# Patient Record
(demographics unavailable — no encounter records)

---

## 2021-01-01 NOTE — NUR
0515: Infant delivered in labor and delivery room by Dr. Liu. Infant to mom's chest. 
Infant warmed, dried, and stimulated. Mouth suctioned using bulb syringe. Spontaneous/lusty 
cry noted. 

0517: Cord clamped and cut by FOB. 

0518: One minute APGAR of 8 with two off for color. Warm, dry towel replaced under infant. 
Hat placed on infant. Infant taken to warmer per mom's request. Infant remains cyanotic 
throughout. Lungs are wet. HR and respirations are WNL's. CPT done at this time. Mouth 
suctioned using bulb syringe. Slight crackles remain with auscultation. Deep suction passed 
one time with minimal return. 

0520. Erythromycin ointment applied to eyes, and Vitamin K. given in right thigh. 

0522: Lusty cry noted. Vitals taken. Temp slightly cold. Infant remain in warmer to warm up. 


0533 Footprint and measurement taken. 

0534: Vitals taken and WNL's.

0535: Infant to mom and skin to skin at this time. 

0545: Temp taken and remains stable. 

0600: Infant latched to breast at this time.

## 2021-01-01 NOTE — NEWBORN INFANT-DISCHARGE
[place] Infant Discharge


Subjective/Events-Last Exam


Breastfeeding well.


Date Patient Was Seen:  2021


Time Patient Was Seen:  18:00





Condition/Feeding


 Feeding Method:  Breast Milk-Exclusive





Discharge Examination


Level of Alertness:  Sleeping


Activity/State:  Deep Sleep


Head Circumference:  13.25


Fontanelles:  Soft


Anterior [place] Descriptio:  WNL


Cephalohematoma:  No


Sclera Description:  Clear


Ears:  Normal


Mouth, Nose, Eyes:  Hard & Soft Palate Intact


Neck:  Head Mobile, Clavicles Intact


Chest Circumference:  14.00


Cardiovascular:  Regular Rhythm


Respiratory:  Regular


Breath Sounds:  Crackles


Caput Succedaneum:  No


Abdomen:  Soft


Abdomen Circumference:  13.50


Genitalia:  Appear Normal


Back:  Spine Closed


Hips:  WNL


Movement:  Symmetric-Body, Full ROM


Muscle Tone:  Active





Weight/Height


Height (Inches):  21.75


Height (Calculated Centimeters:  55.603398


Weight (Pounds):  8


Weight (Ounces):  8.3


Weight (Calculated Kilograms):  3.990325


Weight (Calculated Grams):  3864.040





Vital Signs/Labs/SS


Vital Signs





Vital Signs








  Date Time  Temp Pulse Resp B/P (MAP) Pulse Ox O2 Delivery O2 Flow Rate FiO2


 


21 12:30 36.8 140 50     


 


21 20:15 37.3 124 48     


 


21 07:15 36.5 120 40     








Labs


Laboratory Tests


21 06:19: Glucometer 32*L


21 06:58: Glucometer 38*L


21 07:55: Glucometer 62


21 10:35: Glucometer 47


21 14:18: Glucometer 71


21 17:49: Glucometer 75


21 01:03: Glucometer 69


21 08:00:  Total Bilirubin 6.3





Hearing Screening


Date of Hearing Screening:  2021


Results of Hearing Screening:  Pass





Discharge Diagnosis/Plan


Hep B Vaccine Given?:  Yes


PKU/Bili Done?:  Yes


Discharge Diagnosis/Impression:  Birth (), Infant (female), Living, Term 

(40w6d)


Impression Note:


1.  Term  female delivered by 


-home this evening of 2021


-infant to continue with BF


-FU with Dr Braxton in 1 week.











CARTER BRAXTON MD              2021 17:57

## 2021-01-01 NOTE — PROGRESS NOTE - NEWBORN
NB-Subjective/ROS


Subjective/ROS


Subjective/Events-last exam


Breastfeeding well according to  mother





NB-Exam


Condition/Feeding


[place] Feeding Method:  Breast





Examination


Vitals





Vital Signs








  Date Time  Temp Pulse Resp B/P (MAP) Pulse Ox O2 Delivery O2 Flow Rate FiO2


 


21 20:15 37.3 124 48     


 


21 07:15 36.5 120 40     








Level of Alertness:  Sleeping


Activity/State:  Deep Sleep


Skin:  Stork Bites


Head Circumference:  13.25


Fontanelles:  Soft


Anterior [place] Descriptio:  WNL


Cephalohematoma:  No


Sclera Description:  Clear


Mouth, Nose, Eyes:  Hard & Soft Palate Intact


Neck:  Head Mobile, Clavicles Intact


Chest Circumference:  14.00


Cardiovascular:  Regular Rhythm


Respiratory:  Regular


Breath Sounds:  Crackles


Caput Succedaneum:  No


Abdomen:  Soft


Abdomen Circumference:  13.50


Genitalia:  Appear Normal


Back:  Spine Closed


Hips:  WNL


Movement:  Symmetric-Body, Full ROM


Muscle Tone:  Active





Weight/Height(Last Documented)


Height (Inches):  21.75


Height (Calculated Centimeters:  55.268589


Weight (Pounds):  8


Weight (Ounces):  8.3


Weight (Calculated Kilograms):  3.441913


Weight (Calculated Grams):  3864.040





Labs


Labs


Laboratory Tests


21 06:58: Glucometer 38*L


21 07:55: Glucometer 62


21 10:35: Glucometer 47


21 14:18: Glucometer 71


21 17:49: Glucometer 75


21 01:03: Glucometer 69





NB-Plan/Progress


Plan/Progress


1.  Term  female delivered via 


-BF CARTER Pulliam MD              2021 06:45

## 2021-01-01 NOTE — DISCHARGE INST-NURSERY
Discharge Inst-Nursery


Reconcile Patient Problems


Problems Reviewed?:  Yes





Instructions/Follow Up


Patient Instructions/Follow Up:  


Dr Braxton in 1 week





Activity


Avoid ALL Tobacco Products:  Second Hand Smoke





Diet


Pediatric Feeding Method:  Breast





Symptoms Report to Physician


[facility]:  


poor feeding or poor urine output.  Fever greater than 100.5


Parent Questions Call:  Call your physician


For Problems/Questions:  Contact Your Physician











CARTER BRAXTON MD              Jan 21, 2021 17:56

## 2021-01-01 NOTE — NUR
shift assessment completed.  skin color pink tones.  resp unlabored with breath sounds CTA.  
HRRR. abd soft with positive bowel sounds. cord drying without drainage.  diaper change done 
 large transitional stool noted.  infant moves all extremities actively. mother 
supplementing intermittently with similac.

## 2021-01-01 NOTE — NUR
infant in room with mother per request.  mother reports dr leyva was here at 0630 this  
morning to see infant

## 2021-01-01 NOTE — NUR
home care instructions reviewed with parents.  bracelets matched.  follow up appointment 
reviewed with parents.  mother to call tomorrow for appointment for Wednesday for baby.  
mother acknowledges understanding of instructions verbally and with her signature.  parents 
preparing for discharge.

## 2021-01-01 NOTE — NEWBORN INFANT H&P-ADMISSION
[facility] Infant Record


Exam Date & Time


Date seen by provider:  2021


Time seen by provider:  05:30





Provider


PCP


Carter Braxton MD





Delivery Assessment


Expected Date of Delivery:  2021


Hx :  2


Hx Para:  2


Gestational Age in Weeks:  40


Gestational Age in Days:  6


Amniotic Membrane Rupture Time:  02:55


Delivery Date:  2021


Delivery Time:  05:15


Condition of Infant:  Living


Infant Delivery Method:  Spontaneous Vaginal


Operative Indications ([place]:  N/A-Vaginal Delivery


Anesthesia Type:  None


Prenatal Events:  Routine Prenatal care


Intrapartal Events:  None


Gender:  Female


Viability:  Living





Mother's Group Strep


Mother's Group B Strep:  Positive


# of Doses for Mother:  1





Maternal Labs


Hep B:  Negative


Rubella:  Immune





Apgar Score


Apgar Score at 1 Minute:  8


Apgar Score at 5 Minutes:  8





Condition/Feeding


Benefits of breastfeeding discussed with mother.


[place] Feeding Method:  Breast Milk-Exclusive


Gestation:  Single





Admission Examination


Level of Alertness:  Alert


Activity/State:  Crying


Skin:  Vernix


Fontanelles:  Soft


Anterior [place] Descriptio:  WNL


Cephalohematoma:  No


Sclera Description:  Clear


Ears:  Normal


Mouth, Nose, Eyes:  Hard & Soft Palate Intact


Neck:  Head Mobile, Clavicles Intact


Cardiovascular:  Regular Rhythm


Respiratory:  Regular


Breath Sounds:  Crackles


Caput Succedaneum:  No


Abdomen:  Soft


Genitalia:  Appear Normal


Back:  Spine Closed


Hips:  WNL


Movement:  Symmetric-Body, Full ROM


Muscle Tone:  Active





Weight/Height


Weight (Pounds):  9


Weight (Ounces):  1





Impression on Admission


Impression on Admission:  Birth (), Infant (female), Living, Term (40w6d)





Progress/Plan/Problem List


Progress/Plan


1.  Admit to level 1 nursery


-infant to 











CARTER BRAXTON MD              2021 06:00

## 2021-01-01 NOTE — NUR
Infant finished breastfeeding.  To nursery at time for daily weight.  Weight obtained.  
Blood glucose level assessed, 69 mg/dL.  Infant wrapped in clean linen.  Crib stocked.  No 
concerns voiced by parents at time.

## 2021-01-01 NOTE — NUR
MOB awake in bed, holding infant.  FOB at side.  Introduced self to parents and discussed 
POC, parents verbalized understanding.  Infant assessed in room.  Parents state feedings are 
going well.  Deny any concerns at time.